# Patient Record
(demographics unavailable — no encounter records)

---

## 2024-11-15 NOTE — DISCUSSION/SUMMARY
[] : The components of the vaccine(s) to be administered today are listed in the plan of care. The disease(s) for which the vaccine(s) are intended to prevent and the risks have been discussed with the caretaker.  The risks are also included in the appropriate vaccination information statements which have been provided to the patient's caregiver.  The caregiver has given consent to vaccinate. [FreeTextEntry1] : Patient is here to receive covid immunization.

## 2024-12-27 NOTE — PHYSICAL EXAM
[Clear] : right tympanic membrane not clear [Erythema] : erythema [Bulging] : bulging [NL] : warm, clear [de-identified] : Normal

## 2025-01-09 NOTE — PHYSICAL EXAM
[General Appearance - Alert] : alert [General Appearance - In No Acute Distress] : in no acute distress [General Appearance - Well Nourished] : well nourished [General Appearance - Well Developed] : well developed [General Appearance - Well-Appearing] : well appearing [Appearance Of Head] : the head was normocephalic [Facies] : there were no dysmorphic facial features [Sclera] : the conjunctiva were normal [Outer Ear] : the ears and nose were normal in appearance [Examination Of The Oral Cavity] : mucous membranes were moist and pink [Auscultation Breath Sounds / Voice Sounds] : breath sounds clear to auscultation bilaterally [Respiration, Rhythm And Depth] : normal respiratory rhythm and effort [Normal Chest Appearance] : the chest was normal in appearance [Apical Impulse] : quiet precordium with normal apical impulse [Heart Rate And Rhythm] : normal heart rate and rhythm [Heart Sounds] : normal S1 and S2 [Heart Sounds Gallop] : no gallops [Heart Sounds Pericardial Friction Rub] : no pericardial rub [Edema] : no edema [Arterial Pulses] : normal upper and lower extremity pulses with no pulse delay [Heart Sounds Click] : no clicks [Capillary Refill Test] : normal capillary refill [Systolic] : systolic [II] : a grade 2/6 [LLSB] : LLSB  [Vibratory] : vibratory [Bowel Sounds] : normal bowel sounds [Abdomen Soft] : soft [Nondistended] : nondistended [Abdomen Tenderness] : non-tender [Nail Clubbing] : no clubbing  or cyanosis of the fingers [Motor Tone] : normal muscle strength and tone [] : no rash [Skin Lesions] : no lesions [Skin Turgor] : normal turgor [Demonstrated Behavior - Infant Nonreactive To Parents] : interactive [Mood] : mood and affect were appropriate for age [Demonstrated Behavior] : normal behavior

## 2025-01-09 NOTE — HISTORY OF PRESENT ILLNESS
[FreeTextEntry1] : Jace Gallo is a 7-year-old boy, who carries a diagnosis of ADHD, otherwise he is a healthy child. Mother is MYBPC gene positive for HCM, prompting cardiology evaluation today.   Jace was born full term via vacuum assisted delivery. No prenatal or birth complications reported.  Jace has no complaints of chest discomfort, shortness of breath or palpitations.  There have been no near syncope or syncopal events. He underwent T&A 3/2024 for mild sleep apnea and frequent throat infections.   FH:   Mother - gene positive for MYBPC gene for HCM  Maternal Uncle (mother's brother, 38 yrs) - MYBPC gene positive, no symptoms  Maternal Aunt (mother's sister, 33 yrs) - healthy, gene negative  Father -  healthy   Brother - 7 year old, healthy   MGF - HCM, gene + MYBPC, ICD   Maternal great uncle -  in 50's "MI" No reports of sudden death or unexplained death in children.   No reports of complaints of chest discomfort, flutters or shortness of breath. There have been no near syncope or syncopal events

## 2025-01-09 NOTE — CARDIOLOGY SUMMARY
[Today's Date] : [unfilled] [Normal] : normal [FreeTextEntry1] : Normal sinus rhythm at a rate of 78 [FreeTextEntry2] : Normal cardiac segmental anatomy, normal biventricular size and systolic function, no abnormalities appreciated.

## 2025-01-09 NOTE — DISCUSSION/SUMMARY
[May participate in all age-appropriate activities] : [unfilled] May participate in all age-appropriate activities. [FreeTextEntry1] : 7 year old young boy who has a strong family history of MYBPC3 gene positive pathogenic variant with a positive phenotype for hypertrophic cardiomyopathy. Jace has a normal echocardiogram and ECG. He has a benign innocent murmur of childhood. Discussed genetic testing with mother who would like to have him tested.   Recommendations: - Genetic testing with cardiogenomics program- will send email to Dr Meng - If genetic testing is positive, inconclusive, or not done- follow up in a year with ECG and echocardiogram. If genetic testing is negative, does not require routine cardiac follow up, however, we would be happy to see him if any concerns arise.  [Needs SBE Prophylaxis] : [unfilled] does not need bacterial endocarditis prophylaxis

## 2025-01-09 NOTE — CONSULT LETTER
[Today's Date] : [unfilled] [Dear  ___:] : Dear Dr. [unfilled]: [Consult - Single Provider] : Thank you very much for allowing me to participate in the care of this patient. If you have any questions, please do not hesitate to contact me. [Sincerely,] : Sincerely, [FreeTextEntry4] : Dr Trejo [FreeTextEntry5] : [address] [FreeTextEntry6] : [place] [address] [de-identified] : Arielle Beckwith, MSN, CPNP AC, PCPediatric Cardiology Pediatric Heart Transplant and Heart Failure [facility] Sina Posey [facility]  Dl Macias MD Pediatric Cardiologist Director, Pediatric Heart Transplant and Heart Failure [facility]  [facility] [place] [facility] [employer] at [facility]

## 2025-01-14 NOTE — REASON FOR VISIT
[Follow-Up Evaluation] : a follow-up evaluation for [ADHD] : ADHD [Mother] : mother [Father] : father [Medical Records] : medical records

## 2025-01-15 NOTE — HISTORY OF PRESENT ILLNESS
[FreeTextEntry1] : JACE is a 6yo male presents for f/u evaluation of ADHD.  Previously followed by RINA Navarro.    Reviewed history: Last seen X 11/2023 for follow-up of ADHD, diagnosed in August 2023 via [employer], with a 504 plan in place. A cardiology evaluation was prompted by the mother's positive MYBPC gene status for hypertrophic cardiomyopathy (HCM).  F/U 01/15/2025 Currently in 2nd grade, ICT setting within the general education population, continues with 504 accommodations and performs at grade level academically. While demonstrating strong academic skills and advanced reading and math abilities when focused, teachers report he struggles to keep up with classwork, requiring frequent redirection and refocusing. He distracts himself by reading, doodling, and hyperfocusing on subjects of interest. At home, he is described as "letting loose." Dietary changes, including eliminating red dye #40, have been beneficial. He takes multivitamins, omega-3, and fiber supplements.  Here today for ongoing challenges with impulse control, such as interrupting and talking over others. He participates in group counseling at school, focusing on coping mechanisms, but does not receive outside behavioral therapy. His mother notes that a classmate with special needs, seems to worsen Pancho impulse control difficulties.  The family plans to move to [place] this summer  Sleep: Hx T&A. Denies snoring. + Restless sleep.   Previous chart note: 11/27/2023 Jace is a 6 y.o. being seen for follow up for ADHD. [employer] forms positive for ADHD. Received 504 accommodations for ADHD and accommodations have been put in place at the beginning of the school year. Consists of frequent breaks, sensory fidget objects, redirection and preferential seating. Has had a meeting with a teacher and school psychologist to implement 504 plan and is making good progress. Teacher has been on Grand Jury duty for the past 3 weeks and Jace has been effected by this, perhaps more impulsive at home. Teacher returns to class today and mom plans to get her opinion on how accommodations are going for Jace. Dad reports that he is doing "light years better than " and notices a change since implementing accommodations. Psychoeducational testing not completed yet. School will reevaluate necessity at next 504 meeting at the end of the school year. Mom reports that behavior such as impulsivity comes and goes at home, not worse than prior. Continues to meet grade level requirements, A student.   Followed by urology for enuresis and is improving per mom.

## 2025-01-15 NOTE — PHYSICAL EXAM
[Well-appearing] : well-appearing [Normocephalic] : normocephalic [Alert] : alert [Conversant] : conversant [No abnormal involuntary movements] : no abnormal involuntary movements [de-identified] : exam limited due to tele health [de-identified] : unable to assess due to telehealth [de-identified] : unable to assess due to telehealth [No dysmorphic facial features] : no dysmorphic facial features [Well related, good eye contact] : well related, good eye contact [Normal speech and language] : normal speech and language [Follows instructions well] : follows instructions well [Gets up on table without difficulty] : gets up on table without difficulty [5/5 strength in proximal and distal muscles of arms and legs] : 5/5 strength in proximal and distal muscles of arms and legs [Walks and runs well] : walks and runs well [Good walking balance] : good walking balance [Normal gait] : normal gait [de-identified] : No respiratory distress noted.

## 2025-01-15 NOTE — CONSULT LETTER
[Dear  ___] : Dear  [unfilled], [Courtesy Letter:] : I had the pleasure of seeing your patient, [unfilled], in my office today. [Please see my note below.] : Please see my note below. [Sincerely,] : Sincerely, [FreeTextEntry3] : PEDRO LUIS Torres Certified Pediatric Nurse Practitioner  Pediatric Neurology  [facility]

## 2025-01-15 NOTE — DATA REVIEWED
[FreeTextEntry1] : [place] forms reviewed again  Cardiology discussion/Summary x 01/09/2025 7 year old young boy who has a strong family history of MYBPC3 gene positive pathogenic variant with a positive phenotype for hypertrophic cardiomyopathy. Lillie has a normal echocardiogram and ECG. He has a benign innocent murmur of childhood. Discussed genetic testing with mother who would like to have him tested.  Recommendations: - Genetic testing with cardiogenomics program- will send email to Dr Meng - If genetic testing is positive, inconclusive, or not done- follow up in a year with ECG and echocardiogram. If genetic testing is negative, does not require routine cardiac follow up, however, we would be happy to see him if any concerns arise.    SBE Prophylaxis: LILLIE does not need bacterial endocarditis prophylaxis.    Guidelines for Physical Activity in School: LILLIE May participate in all age-appropriate activities.

## 2025-01-15 NOTE — PLAN
[FreeTextEntry1] : -  Discussed use of Omega 3 fish oil and magnseium supplements.  - Discussed use of medications as well as side effects if accommodations do not improve school performance. Need cardiac clearance prior to start of stimulant.  - Discussed potential benefits of behavioral therapy and resources provided.  - Follow up 1 month after start of stimulant. Call sooner if any concerns.

## 2025-01-15 NOTE — ASSESSMENT
[FreeTextEntry1] : LILLIE IS a 7-year-old male, presented for follow-up evaluation of his ADHD, diagnosed in August 2023 by RINA Navarro via [facility]. He is currently in second grade with a 504 plan in place, attending classes in an ICT setting within the general education population. He is performing at grade level academically but continues to require frequent redirection in class due to self-distraction. Impulse control remains a challenge, notably interrupting and talking over others. He attends group counseling at school but does not receive outside behavioral therapy. A prior cardiology evaluation was performed due to a family history of hypertrophic cardiomyopathy. His physical exam was unremarkable.

## 2025-01-15 NOTE — ASSESSMENT
[FreeTextEntry1] : LILLIE IS a 7-year-old male, presented for follow-up evaluation of his ADHD, diagnosed in August 2023 by RINA Navarro via [facility]. He is currently in second grade with a 504 plan in place, attending classes in an ICT setting within the general education population. He is performing at grade level academically but continues to require frequent redirection in class due to self-distraction. Impulse control remains a challenge, notably interrupting and talking over others. He attends group counseling at school but does not receive outside behavioral therapy. A prior cardiology evaluation was performed due to a family history of hypertrophic cardiomyopathy. His physical exam was unremarkable.

## 2025-01-15 NOTE — DATA REVIEWED
[FreeTextEntry1] : [employer] forms reviewed again  Cardiology discussion/Summary x 01/09/2025 7 year old young boy who has a strong family history of MYBPC3 gene positive pathogenic variant with a positive phenotype for hypertrophic cardiomyopathy. Lillie has a normal echocardiogram and ECG. He has a benign innocent murmur of childhood. Discussed genetic testing with mother who would like to have him tested.  Recommendations: - Genetic testing with cardiogenomics program- will send email to Dr Meng - If genetic testing is positive, inconclusive, or not done- follow up in a year with ECG and echocardiogram. If genetic testing is negative, does not require routine cardiac follow up, however, we would be happy to see him if any concerns arise.    SBE Prophylaxis: LILLIE does not need bacterial endocarditis prophylaxis.    Guidelines for Physical Activity in School: LILLIE May participate in all age-appropriate activities.

## 2025-01-15 NOTE — HISTORY OF PRESENT ILLNESS
[FreeTextEntry1] : JACE is a 8yo male presents for f/u evaluation of ADHD.  Previously followed by RINA Navarro.    Reviewed history: Last seen X 11/2023 for follow-up of ADHD, diagnosed in August 2023 via [employer], with a 504 plan in place. A cardiology evaluation was prompted by the mother's positive MYBPC gene status for hypertrophic cardiomyopathy (HCM).  F/U 01/15/2025 Currently in 2nd grade, ICT setting within the general education population, continues with 504 accommodations and performs at grade level academically. While demonstrating strong academic skills and advanced reading and math abilities when focused, teachers report he struggles to keep up with classwork, requiring frequent redirection and refocusing. He distracts himself by reading, doodling, and hyperfocusing on subjects of interest. At home, he is described as "letting loose." Dietary changes, including eliminating red dye #40, have been beneficial. He takes multivitamins, omega-3, and fiber supplements.  Here today for ongoing challenges with impulse control, such as interrupting and talking over others. He participates in group counseling at school, focusing on coping mechanisms, but does not receive outside behavioral therapy. His mother notes that a classmate with special needs, seems to worsen Pancho impulse control difficulties.  The family plans to move to [place] this summer  Sleep: Hx T&A. Denies snoring. + Restless sleep.   Previous chart note: 11/27/2023 Jace is a 6 y.o. being seen for follow up for ADHD. [employer] forms positive for ADHD. Received 504 accommodations for ADHD and accommodations have been put in place at the beginning of the school year. Consists of frequent breaks, sensory fidget objects, redirection and preferential seating. Has had a meeting with a teacher and school psychologist to implement 504 plan and is making good progress. Teacher has been on Grand Jury duty for the past 3 weeks and Jace has been effected by this, perhaps more impulsive at home. Teacher returns to class today and mom plans to get her opinion on how accommodations are going for Jace. Dad reports that he is doing "light years better than " and notices a change since implementing accommodations. Psychoeducational testing not completed yet. School will reevaluate necessity at next 504 meeting at the end of the school year. Mom reports that behavior such as impulsivity comes and goes at home, not worse than prior. Continues to meet grade level requirements, A student.   Followed by urology for enuresis and is improving per mom.

## 2025-01-15 NOTE — CONSULT LETTER
[Dear  ___] : Dear  [unfilled], [Courtesy Letter:] : I had the pleasure of seeing your patient, [unfilled], in my office today. [Please see my note below.] : Please see my note below. [Sincerely,] : Sincerely, [FreeTextEntry3] : PEDRO LUIS Torres Certified Pediatric Nurse Practitioner  Pediatric Neurology  [facility]

## 2025-01-15 NOTE — PHYSICAL EXAM
[Well-appearing] : well-appearing [Normocephalic] : normocephalic [Alert] : alert [Conversant] : conversant [No abnormal involuntary movements] : no abnormal involuntary movements [de-identified] : exam limited due to tele health [de-identified] : unable to assess due to telehealth [de-identified] : unable to assess due to telehealth [No dysmorphic facial features] : no dysmorphic facial features [Well related, good eye contact] : well related, good eye contact [Normal speech and language] : normal speech and language [Follows instructions well] : follows instructions well [Gets up on table without difficulty] : gets up on table without difficulty [5/5 strength in proximal and distal muscles of arms and legs] : 5/5 strength in proximal and distal muscles of arms and legs [Walks and runs well] : walks and runs well [Good walking balance] : good walking balance [Normal gait] : normal gait [de-identified] : No respiratory distress noted.

## 2025-03-24 NOTE — HISTORY OF PRESENT ILLNESS
[Father] : father [Normal] : Normal [Yes] : Patient goes to dentist yearly [None] : Primary Fluoride Source: None [No] : No cigarette smoke exposure [Up to date] : Up to date [NO] : No [de-identified] : Father refuse Fluoride supplementation

## 2025-03-24 NOTE — RISK ASSESSMENT
[Has anyone in your immediate family (parents, grandparents, siblings) or other more distant relatives (aunts, uncles, cousins)  of heart] : Has anyone in your immediate family (parents, grandparents, siblings) or other more distant relatives (aunts, uncles, cousins)  of heart problems or had an unexpected sudden death before age 50 (This would include unexpected drownings, unexplained car accidents in which the relative was driving or sudden infant death syndrome.)? Yes [Are you related to anyone with hypertrophic cardiomyopathy or hypertrophic obstructive cardiomyopathy, Marfan syndrome, arrhythmogenic] : Are you related to anyone with hypertrophic cardiomyopathy or hypertrophic obstructive cardiomyopathy, Marfan syndrome, arrhythmogenic right ventricular cardiomyopathy, long QT syndrome, short QT syndrome, Brugada syndrome or catecholaminergic polymorphic ventricular tachycardia, or anyone younger than 50 years with a pacemaker or implantable defibrillator? Yes [Family history of SCA predisposing conditions] : Family history of SCA predisposing conditions

## 2025-03-24 NOTE — PHYSICAL EXAM
[Alert] : alert [No Acute Distress] : no acute distress [Normocephalic] : normocephalic [Conjunctivae with no discharge] : conjunctivae with no discharge [PERRL] : PERRL [EOMI Bilateral] : EOMI bilateral [Auricles Well Formed] : auricles well formed [Clear Tympanic membranes with present light reflex and bony landmarks] : clear tympanic membranes with present light reflex and bony landmarks [No Discharge] : no discharge [Nares Patent] : nares patent [Pink Nasal Mucosa] : pink nasal mucosa [Palate Intact] : palate intact [Nonerythematous Oropharynx] : nonerythematous oropharynx [Supple, full passive range of motion] : supple, full passive range of motion [No Palpable Masses] : no palpable masses [Symmetric Chest Rise] : symmetric chest rise [Clear to Auscultation Bilaterally] : clear to auscultation bilaterally [Regular Rate and Rhythm] : regular rate and rhythm [Normal S1, S2 present] : normal S1, S2 present [No Murmurs] : no murmurs [+2 Femoral Pulses] : +2 femoral pulses [Soft] : soft [NonTender] : non tender [Non Distended] : non distended [Normoactive Bowel Sounds] : normoactive bowel sounds [No Hepatomegaly] : no hepatomegaly [No Splenomegaly] : no splenomegaly [Jarvis: _____] : Jarvis [unfilled] [Circumcised] : circumcised [Central Urethral Opening] : central urethral opening [Testicles Descended Bilaterally] : testicles descended bilaterally [Patent] : patent [No Abnormal Lymph Nodes Palpated] : no abnormal lymph nodes palpated [No Gait Asymmetry] : no gait asymmetry [No pain or deformities with palpation of bone, muscles, joints] : no pain or deformities with palpation of bone, muscles, joints [Normal Muscle Tone] : normal muscle tone [Straight] : straight [Cranial Nerves Grossly Intact] : cranial nerves grossly intact [No Rash or Lesions] : no rash or lesions

## 2025-04-01 NOTE — CONSULT LETTER
[Dear  ___] : Dear  [unfilled], [Courtesy Letter:] : I had the pleasure of seeing your patient, [unfilled], in my office today. [Please see my note below.] : Please see my note below. [Sincerely,] : Sincerely, [FreeTextEntry3] : PEDRO LUIS Torres Certified Pediatric Nurse Practitioner  Pediatric Neurology  [facility]

## 2025-04-01 NOTE — ASSESSMENT
[FreeTextEntry1] : LILLIE is an 8-year-old male with a previous diagnosis of ADHD (August 2023, by RINA Navarro), presented for follow-up. He has a 504 plan and attends group counseling at school. Rec'd cardiology clearance (x 01/2025, due to family history of HCM), and started on Metadate. Stimulant was shortly d/c due to decreased appetite. Despite not taking medication, teachers reported perceived improvement at a recent parent-teacher conference. The family plans to move to [place] in August. Parents report observing natural improvement, with his LILLIE excelling in math and reading (3rd-grade level). He continues Omega-3 supplements. Discussed continuing without stimulant medication given Pancho reported improvement and age-appropriate academic performance. The option of restarting/retrying a stimulant if academic concerns arise or if teachers express new concerns was discussed.  Parents verbalized understanding. All questions answered.

## 2025-04-01 NOTE — HISTORY OF PRESENT ILLNESS
[FreeTextEntry1] : LILLIE is an 9yo male presents for f/u evaluation of ADHD.  Previously followed by RINA Navarro.    Reviewed history: Dg w/ ADHD via [place] questionnaires x 08/2023 by RINA Navarro. Has a 504 plan in place.  He attends group counseling at school but does not receive outside behavioral therapy. Evaluated and cleared by cardiology for start of stimulant x 01/2025 due to mother's positive MYBPC gene status for hypertrophic cardiomyopathy (HCM).  f/u 04/01/2025 Cardiology clearance obtained x 01/17/2025. Recommended start of Focalin XR 5 mg. Which was switched to Metadate as alternative since Focalin on back order.  Not much improvement in focus. However, did have decreased appetite. Medication was d/c due to this s/e, as LILLIE is typically a big eater.  Recent parent teacher conference, teachers reported perceived improvement around this time, though he was not actually taking medication. Of note, getting ready for big move - will be moving to [place] x August.  Father reports showing signs of improvement, naturally w/o medication.  Academically excelling in math and reading (performing on 3rd grade level) Continues Omega 3 (smarty pants).  Not much improvment iwth Magnesium  - s/e GI upset and frequent nighttime awakenings.   F/U 01/15/2025 Currently in 2nd grade, ICT setting within the general education population, continues with 504 accommodations and performs at grade level academically. While demonstrating strong academic skills and advanced reading and math abilities when focused, teachers report he struggles to keep up with classwork, requiring frequent redirection and refocusing. He distracts himself by reading, doodling, and hyperfocusing on subjects of interest. At home, he is described as "letting loose." Dietary changes, including eliminating red dye #40, have been beneficial. He takes multivitamins, omega-3, and fiber supplements.  Here today for ongoing challenges with impulse control, such as interrupting and talking over others. He participates in group counseling at school, focusing on coping mechanisms, but does not receive outside behavioral therapy. His mother notes that a classmate with special needs, seems to worsen Pancho impulse control difficulties.  The family plans to move to [place] this summer  Sleep: Hx T&A. Denies snoring. + Restless sleep.   Previous chart note: 11/27/2023 Lillie is a 6 y.o. being seen for follow up for ADHD. [place] forms positive for ADHD. Received 504 accommodations for ADHD and accommodations have been put in place at the beginning of the school year. Consists of frequent breaks, sensory fidget objects, redirection and preferential seating. Has had a meeting with a teacher and school psychologist to implement 504 plan and is making good progress. Teacher has been on Grand Jury duty for the past 3 weeks and Lillie has been effected by this, perhaps more impulsive at home. Teacher returns to class today and mom plans to get her opinion on how accommodations are going for Lillie. Dad reports that he is doing "light years better than " and notices a change since implementing accommodations. Psychoeducational testing not completed yet. School will reevaluate necessity at next 504 meeting at the end of the school year. Mom reports that behavior such as impulsivity comes and goes at home, not worse than prior. Continues to meet grade level requirements, A student.   Followed by urology for enuresis and is improving per mom.

## 2025-04-01 NOTE — PHYSICAL EXAM
[Well-appearing] : well-appearing [Normocephalic] : normocephalic [No dysmorphic facial features] : no dysmorphic facial features [Alert] : alert [Well related, good eye contact] : well related, good eye contact [Conversant] : conversant [Normal speech and language] : normal speech and language [Follows instructions well] : follows instructions well [Gets up on table without difficulty] : gets up on table without difficulty [No abnormal involuntary movements] : no abnormal involuntary movements [5/5 strength in proximal and distal muscles of arms and legs] : 5/5 strength in proximal and distal muscles of arms and legs [Walks and runs well] : walks and runs well [Good walking balance] : good walking balance [Normal gait] : normal gait [de-identified] : limited due to tele visit.  [de-identified] : No respiratory distress noted.

## 2025-04-01 NOTE — REASON FOR VISIT
[Home] : at home, [unfilled] , at the time of the visit. [Other Location: e.g. Home (Enter Location, City,State)___] : at [unfilled] [Telehealth (audio & video)] : This visit was provided via telehealth using real-time 2-way audio visual technology. [Verbal consent obtained from patient] : the patient, [unfilled] [Follow-Up Evaluation] : a follow-up evaluation for [ADHD] : ADHD [Mother] : mother [Father] : father [Medical Records] : medical records [Parents] : parents

## 2025-04-01 NOTE — ASSESSMENT
[FreeTextEntry1] : LILLIE is an 8-year-old male with a previous diagnosis of ADHD (August 2023, by RINA Navarro), presented for follow-up. He has a 504 plan and attends group counseling at school. Rec'd cardiology clearance (x 01/2025, due to family history of HCM), and started on Metadate. Stimulant was shortly d/c due to decreased appetite. Despite not taking medication, teachers reported perceived improvement at a recent parent-teacher conference. The family plans to move to [place] in August. Parents report observing natural improvement, with his LILLIE excelling in math and reading (3rd-grade level). He continues Omega-3 supplements. Discussed continuing without stimulant medication given Pancho reported improvement and age-appropriate academic performance. The option of restarting/retrying a stimulant if academic concerns arise or if teachers express new concerns was discussed.  Parents verbalized understanding. All questions answered.

## 2025-04-01 NOTE — CONSULT LETTER
[Dear  ___] : Dear  [unfilled], [Courtesy Letter:] : I had the pleasure of seeing your patient, [unfilled], in my office today. [Please see my note below.] : Please see my note below. [Sincerely,] : Sincerely, [FreeTextEntry3] : PEDRO LUIS Torres Certified Pediatric Nurse Practitioner  Pediatric Neurology  [facility]

## 2025-04-01 NOTE — HISTORY OF PRESENT ILLNESS
[FreeTextEntry1] : LILLIE is an 7yo male presents for f/u evaluation of ADHD.  Previously followed by RINA Navarro.    Reviewed history: Dg w/ ADHD via [place] questionnaires x 08/2023 by RINA Navarro. Has a 504 plan in place.  He attends group counseling at school but does not receive outside behavioral therapy. Evaluated and cleared by cardiology for start of stimulant x 01/2025 due to mother's positive MYBPC gene status for hypertrophic cardiomyopathy (HCM).  f/u 04/01/2025 Cardiology clearance obtained x 01/17/2025. Recommended start of Focalin XR 5 mg. Which was switched to Metadate as alternative since Focalin on back order.  Not much improvement in focus. However, did have decreased appetite. Medication was d/c due to this s/e, as LILLIE is typically a big eater.  Recent parent teacher conference, teachers reported perceived improvement around this time, though he was not actually taking medication. Of note, getting ready for big move - will be moving to [place] x August.  Father reports showing signs of improvement, naturally w/o medication.  Academically excelling in math and reading (performing on 3rd grade level) Continues Omega 3 (smarty pants).  Not much improvment iwth Magnesium  - s/e GI upset and frequent nighttime awakenings.   F/U 01/15/2025 Currently in 2nd grade, ICT setting within the general education population, continues with 504 accommodations and performs at grade level academically. While demonstrating strong academic skills and advanced reading and math abilities when focused, teachers report he struggles to keep up with classwork, requiring frequent redirection and refocusing. He distracts himself by reading, doodling, and hyperfocusing on subjects of interest. At home, he is described as "letting loose." Dietary changes, including eliminating red dye #40, have been beneficial. He takes multivitamins, omega-3, and fiber supplements.  Here today for ongoing challenges with impulse control, such as interrupting and talking over others. He participates in group counseling at school, focusing on coping mechanisms, but does not receive outside behavioral therapy. His mother notes that a classmate with special needs, seems to worsen Pancho impulse control difficulties.  The family plans to move to [place] this summer  Sleep: Hx T&A. Denies snoring. + Restless sleep.   Previous chart note: 11/27/2023 Lillie is a 6 y.o. being seen for follow up for ADHD. [place] forms positive for ADHD. Received 504 accommodations for ADHD and accommodations have been put in place at the beginning of the school year. Consists of frequent breaks, sensory fidget objects, redirection and preferential seating. Has had a meeting with a teacher and school psychologist to implement 504 plan and is making good progress. Teacher has been on Grand Jury duty for the past 3 weeks and Lillie has been effected by this, perhaps more impulsive at home. Teacher returns to class today and mom plans to get her opinion on how accommodations are going for Lillie. Dad reports that he is doing "light years better than " and notices a change since implementing accommodations. Psychoeducational testing not completed yet. School will reevaluate necessity at next 504 meeting at the end of the school year. Mom reports that behavior such as impulsivity comes and goes at home, not worse than prior. Continues to meet grade level requirements, A student.   Followed by urology for enuresis and is improving per mom.

## 2025-04-01 NOTE — DATA REVIEWED
[FreeTextEntry1] : [place] forms reviewed again  Cardiology discussion/Summary x 01/09/2025 7 year old young boy who has a strong family history of MYBPC3 gene positive pathogenic variant with a positive phenotype for hypertrophic cardiomyopathy. Lillie has a normal echocardiogram and ECG. He has a benign innocent murmur of childhood. Discussed genetic testing with mother who would like to have him tested.  Recommendations: - Genetic testing with cardiogenomics program- will send email to Dr Meng - If genetic testing is positive, inconclusive, or not done- follow up in a year with ECG and echocardiogram. If genetic testing is negative, does not require routine cardiac follow up, however, we would be happy to see him if any concerns arise.    SBE Prophylaxis: LILLIE does not need bacterial endocarditis prophylaxis.    Guidelines for Physical Activity in School: LILLIE May participate in all age-appropriate activities.

## 2025-04-01 NOTE — PHYSICAL EXAM
[Well-appearing] : well-appearing [Normocephalic] : normocephalic [No dysmorphic facial features] : no dysmorphic facial features [Alert] : alert [Well related, good eye contact] : well related, good eye contact [Conversant] : conversant [Normal speech and language] : normal speech and language [Follows instructions well] : follows instructions well [Gets up on table without difficulty] : gets up on table without difficulty [No abnormal involuntary movements] : no abnormal involuntary movements [5/5 strength in proximal and distal muscles of arms and legs] : 5/5 strength in proximal and distal muscles of arms and legs [Walks and runs well] : walks and runs well [Good walking balance] : good walking balance [Normal gait] : normal gait [de-identified] : limited due to tele visit.  [de-identified] : No respiratory distress noted.

## 2025-04-01 NOTE — DATA REVIEWED
[FreeTextEntry1] : [place] forms reviewed again  Cardiology discussion/Summary x 01/09/2025 7 year old young boy who has a strong family history of MYBPC3 gene positive pathogenic variant with a positive phenotype for hypertrophic cardiomyopathy. Lillie has a normal echocardiogram and ECG. He has a benign innocent murmur of childhood. Discussed genetic testing with mother who would like to have him tested.  Recommendations: - Genetic testing with cardiogenomics program- will send email to Dr Meng - If genetic testing is positive, inconclusive, or not done- follow up in a year with ECG and echocardiogram. If genetic testing is negative, does not require routine cardiac follow up, however, we would be happy to see him if any concerns arise.    SBE Prophylaxis: LILLIE does not need bacterial endocarditis prophylaxis.    Guidelines for Physical Activity in School: LILLIE May participate in all age-appropriate activities.

## 2025-04-01 NOTE — PLAN
[FreeTextEntry1] : -  Continue Omega 3 fish oil  - Continue current classroom accommodations.  - Follow up 6 months. Call sooner if any concerns.

## 2025-06-20 NOTE — FAMILY HISTORY
[FreeTextEntry2] : Jayda, Malay, English   [FreeTextEntry3] : Bahamian, Chilean, English, Latvian [TextEntry] : A three-generation family history was constructed and scanned into electronic medical records.  His maternal family history is positive MGF with hx of HCM diagnosed at age 50 and a known pathogenic variant in the MYBPC3. His asymptomatic mother, maternal uncle and 4 yo cousin were also found positive for the variant.  His paternal family history is positive for PGM with hx of pancreatic cancer.  LILLIE RICE has a 5yo asymptomatic sister. Sister is currently undergoing targeted testing for the MYBPC3 gene variant.  The family history was negative for the presence of sudden death or other significant cardiac findings, known genetic disorders or consanguinity.

## 2025-06-20 NOTE — DISCUSSION/SUMMARY
[TextEntry] : LILLIE is an 7yo M with PMH of ADHD.  Lillie has a normal echocardiogram and ECG.   His family history is positive for MGF with PMH of HCM diagnosed at age 55.  His grandfather was tested via [facility] and was found positive for a pathogenic variant in the MYBPC3.  MYBPC3, Exon 24, [id number]:c.2373dup (p.[id number]*41), heterozygous, Pathogenic. His mother, maternal uncle and cousin also tested positive for the pathogenic variant.  Patient underwent genetic testing and presents today for results.   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 MYBPC3 is associated with a spectrum of autosomal dominant cardiomyopathy conditions. This result is consistent with a predisposition to, or diagnosis of, MYBPC3-related conditions.  The MYBPC3 gene is associated with autosomal dominant hypertrophic cardiomyopathy (HCM) (MedGen UID: [id number]) and dilated cardiomyopathy (DCM) (MedGen UID: 2880). Additionally, the MYBPC3 gene has preliminary evidence supporting a correlation with autosomal dominant left ventricular noncompaction (LVNC) (PMID: 15095191)  The specific variant identified in Lillie and his family members represents a premature translational stop signal that has been observed in individuals with hypertrophic cardiomyopathy (HCM) or  cardiomyopathy with features of left ventricular non-compaction and septal defects (PMID: 9321902, 24442469, 63478938, 83096662, 45595246, 45285565). It is commonly reported in individuals of Haitian ancestry (PMID: 63498098, 69059364).  Variants in MYBPC3 exhibit reduced penetrance and variable expressivity, both within and between families.  At this point, I recommended that Lillie continue being followed by his cardiology team. The results were communicated to Dr. Macias.   Targeted testing for family members at risk is available and recommended. Lillie' sister tested negative for the pathogenic variant. Anyone found to be positive for a pathogenic variant in the MYBPC3 should undergo a full cardiac evaluation. In the absence of genetic testing, clinical cardiac evaluation, including physical, ECHO, and EKG, is recommended.  Parent expressed understanding of the presented information and satisfaction with having all of her questions and concerns addressed

## 2025-06-20 NOTE — ASSESSMENT
[TextEntry] : LILLIE is an 9yo M with PMH of ADHD.  His family history is positive for MGF with hx of HCM and a known pathogenic variant in the MYBPC3 gene.   Patient underwent genetic testing and presents today for results.  The results were POSITIVE for one PATHOGENIC variant identified in the MYBPC3.   MYBPC3 is associated with a spectrum of autosomal dominant cardiomyopathy conditions. This result is consistent with a predisposition to, or diagnosis of, MYBPC3-related conditions.  The MYBPC3 gene is associated with autosomal dominant hypertrophic cardiomyopathy (HCM) (MedGen UID: 215452) and dilated cardiomyopathy (DCM) (MedGen UID: 2880). Additionally, the MYBPC3 gene has preliminary evidence supporting a correlation with autosomal dominant left ventricular noncompaction (LVNC) (PMID: 57709054)  The specific variant identified in Lillie and his family members represents a premature translational stop signal that has been observed in individuals with hypertrophic cardiomyopathy (HCM) or  cardiomyopathy with features of left ventricular non-compaction and septal defects.  It is commonly reported in individuals of Bulgarian ancestry (PMID: 75430516, 66337303).  Variants in MYBPC3 exhibit reduced penetrance and variable expressivity, both within and between families.  It is recommended that Lillie continue being followed by his cardiology team. The results were communicated to Dr. Macias.  Targeted testing for family members at risk is available and recommended. Lillie' sister tested negative for the pathogenic variant. Anyone found to be positive for a pathogenic variant in the MYBPC3 should undergo a full cardiac evaluation. In the absence of genetic testing, clinical cardiac evaluation, including physical, ECHO, and EKG, is recommended.   Parent expressed understanding of the presented information and satisfaction with having all of her questions and concerns addressed.

## 2025-06-20 NOTE — HISTORY OF PRESENT ILLNESS
[TextEntry] : LILLIE is  an 9yo M with PMH of ADHD.  EK2025. normal. Normal sinus rhythm at a rate of 78.    Echo: 2025. Normal cardiac segmental anatomy, normal biventricular size and systolic function, no abnormalities appreciated.      He underwent T&A 3/2024 for mild sleep apnea and frequent throat infections.  His family history is notable for a maternal grandfather diagnosed with hypertrophic cardiomyopathy (HCM) at age 55, who carries a known pathogenic variant in the MYBPC3 gene. His asymptomatic mother was also tested for this variant and was also found to be positive.  Patient underwent genetic testing and presents today for results.

## 2025-06-20 NOTE — PLAN
[TextEntry] : 1. See above note for recommended management. 2. Test results were uploaded to the patient's [facility] electronic medical chart and emailed to the mother as per her request.  3. It is important that his long-term management and surveillance is continued as recommended by his cardiologist. The results were communicated to Dr Macias.  4. Targeted testing for the pathogenic variant in the MYBPC3 gene is available and recommended for relatives at risk. Those individuals who are unwilling or unable to be tested should undergo a clinical cardiac evaluation with history, physical exam, EKG and ECHO. 5. Follow-up with Cardiogenomics Program PRN if any new concerns arise or if there are significant changes in family or personal health.  For any additional questions, please call Theresa Lo MS, RJ, Cardiogenomic Program at [phone]  Theresa Lo MS, CGC Assistant Chief, Pediatric Cardiogenomics, [facility] Director Genetics, Program for Cardiac Genetics, Genomics and Precision Medicine  in the Departments of Pediatrics and Cardiology, [facility], [facility]

## 2025-06-20 NOTE — BIRTH HISTORY
[TextEntry] : LILLIE was the 6 lb 11 oz product of a full-term gestation pregnancy (40wks) born by vacuum-assisted delivery. The prenatal history was positive for pre-eclampsia. There were no problems reported in the  period.

## 2025-06-20 NOTE — RESULTS/DATA
[TextEntry] : Sequence analysis and deletion/duplication testing of the gene listed in Results; POSITIVE  MYBPC3 c.2373dup (p.[id number]*41) heterozygous PATHOGENIC DETECTED  One Pathogenic variant identified in MYBPC3. MYBPC3 is associated with a spectrum of autosomal dominant cardiomyopathy conditions. This result is consistent with a predisposition to, or diagnosis of, MYBPC3-related conditions.

## 2025-06-20 NOTE — REASON FOR VISIT
[Home] : at home, [unfilled] , at the time of the visit. [Medical Office: ([facility])___] : at the medical office located in  [Telehealth (audio & video)] : This visit was provided via telehealth using real-time 2-way audio visual technology. [Mother] : mother [Medical Records] : medical records [FreeTextEntry3] : Parent

## 2025-07-24 NOTE — CONSULT LETTER
[FreeTextEntry1] : Dear Dr. JOHAN BAKER,  I had the pleasure of seeing LILLIE RICE for follow up today. Below is my note regarding the office visit today.  Thank you so very much for allowing me to participate in LILLIE's care. Please don't hesitate to call me should any questions or issues arise.  Sincerely, Ketan Munoz MD, FACS, PU Chief, Pediatric Urology Professor of Urology and Pediatrics [employer]  President, American Urological Association - [place] Section Past-President, Societies for Pediatric Urology

## 2025-07-24 NOTE — ASSESSMENT
[FreeTextEntry1] : Jace has urinary incontinence and urinary leakage. History of constipation. Today's Physical examination was unremarkable. The In-office renal and pelvic ultrasound was unremarkable other than rectal dilation (4.0 cm) with stool in the rectum. His Urinalysis showed a specific gravity of >=1.030, otherwise unremarkable. Discussed findings, potential implications and options with the patient's parent and they decided upon the following plan:  - Timed voiding and double voiding - Decrease dietary bladder irritants - Bowel management: Fiber and ExLAX to promote soft, daily bowel movements  Jace and Parent stated that all explanations understood, and all questions were answered and to their satisfaction.  Follow up for voiding studies in 3-4 weeks to evaluate for Dysfunctional voiding.   If symptoms are life impacting and persist despite treatment plan, will consider oxybutynin for symptom relief.

## 2025-07-24 NOTE — HISTORY OF PRESENT ILLNESS
[TextBox_4] : Jace is here for follow-up of secondary intermittent urge incontinence. Patient initially seen for consultation (4/29/21) for above symptom. Meatoplasty performed (5/14/21). Voids 4 times per day with immediate initiation of a non-continuous stream. The bladder feels empty after voiding. Complains of urinary leakage prior to use of restroom. No hematuria, dysuria, UTIs or other voiding complaints. Drinks approximately 16 ounces of water, 8 ounces of orange juice, and 8 ounces of milk per day. Parent denies signs/symptoms of diabetes including excessive thirst, hunger, lethargy, and recent weight loss. There is a moderate intake of bladder irritants. Bowel movements of varying consistencies daily. History of constipation. Current bowel regimen consists of fibrous cereal daily. Stools without straining, pain or bleeding.   Most Recent Renal/bladder Ultrasound (7/25/23) was unremarkable other than rectal dilation (3.0 cm) with stool in the rectum. Most Recent EMG/Uroflow (10/26/23) demonstrated a normal void without bladder sphincter dyssynergia and PVR 23 mL.

## 2025-07-24 NOTE — CONSULT LETTER
[FreeTextEntry1] : Dear Dr. JOHAN BAKER,  I had the pleasure of seeing LILLIE RICE for follow up today. Below is my note regarding the office visit today.  Thank you so very much for allowing me to participate in LILLIE's care. Please don't hesitate to call me should any questions or issues arise.  Sincerely, Ketan Munoz MD, FACS, PU Chief, Pediatric Urology Professor of Urology and Pediatrics [employer]  President, American Urological Association - [place] Section Past-President, Societies for Pediatric Urology

## 2025-07-24 NOTE — DATA REVIEWED
[FreeTextEntry1] : EXAMINATION: RENAL/BLADDER ULTRASOUND  PERFORMED IN THE OFFICE TODAY  FINDINGS: UNREMARKABLE KIDNEYS AND PELVIC STRUCTURES WITH LARGE STOOL IN A DILATED RECTUM (4.0 CM) _____________________________________________________________________________________ URINALYSIS OBTAINED TODAY IN OFFICE SHOWED A SPECIFIC GRAVITY OF >=1.030, OTHERWISE UNREMARKABLE

## 2025-07-24 NOTE — PHYSICAL EXAM
[Well developed] : well developed [Well nourished] : well nourished [Well appearing] : well appearing [Acute distress] : no acute distress [Labored breathing] : non- labored breathing [Rigid] : not rigid [Mass] : no mass [Hepatomegaly] : no hepatomegaly [Splenomegaly] : no splenomegaly [Palpable bladder] : no palpable bladder [RUQ Tenderness] : no ruq tenderness [LUQ Tenderness] : no luq tenderness [RLQ Tenderness] : no rlq tenderness [LLQ Tenderness] : no llq tenderness [Right tenderness] : no right tenderness [Left tenderness] : no left tenderness [Renomegaly] : no renomegaly [Right-side mass] : no right-side mass [Left-side mass] : no left-side mass [Dimple] : no dimple [Hair Tuft] : no hair tuft [TextBox_[id number]] : Parent served as chaperone for examination. PENIS: Circumcised. Meatus at tip of the glans without apparent stenosis. No signs of infection. TESTICLES: Bilateral testicles palpable in the dependent position of the scrotum, vertical lie, do no retract, without any masses, induration or tenderness, and approximately normal size, symmetric, and firm consistency. SCROTAL/INGUINAL: No palpable inguinal hernias or hydroceles.

## 2025-07-24 NOTE — REASON FOR VISIT
[Follow-Up Visit] : a follow-up visit [Patient] : patient [Incontinence] : incontinence [Father] : father

## 2025-07-26 NOTE — PHYSICAL EXAM
[Cerumen in canal] : cerumen in canal [Pain with manipulation of pinna] : pain with manipulation of pinna [Left] : (left) [Clear] : right tympanic membrane clear [NL] : warm, clear [de-identified] : Normal